# Patient Record
Sex: MALE | Race: OTHER | NOT HISPANIC OR LATINO | URBAN - METROPOLITAN AREA
[De-identification: names, ages, dates, MRNs, and addresses within clinical notes are randomized per-mention and may not be internally consistent; named-entity substitution may affect disease eponyms.]

---

## 2017-07-20 ENCOUNTER — EMERGENCY (EMERGENCY)
Facility: HOSPITAL | Age: 32
LOS: 1 days | Discharge: HOME | End: 2017-07-20
Attending: EMERGENCY MEDICINE | Admitting: EMERGENCY MEDICINE
Payer: OTHER MISCELLANEOUS

## 2017-07-20 VITALS
DIASTOLIC BLOOD PRESSURE: 78 MMHG | OXYGEN SATURATION: 97 % | RESPIRATION RATE: 18 BRPM | SYSTOLIC BLOOD PRESSURE: 139 MMHG | TEMPERATURE: 99 F | HEART RATE: 67 BPM

## 2017-07-20 DIAGNOSIS — R10.31 RIGHT LOWER QUADRANT PAIN: ICD-10-CM

## 2017-07-20 DIAGNOSIS — Z90.49 ACQUIRED ABSENCE OF OTHER SPECIFIED PARTS OF DIGESTIVE TRACT: Chronic | ICD-10-CM

## 2017-07-20 PROCEDURE — 99283 EMERGENCY DEPT VISIT LOW MDM: CPT

## 2017-07-20 NOTE — ED PROVIDER NOTE - MUSCULOSKELETAL NEGATIVE STATEMENT, MLM
no back pain, no gout, no musculoskeletal pain outside of groin pain, no neck pain, and no weakness.

## 2017-07-20 NOTE — ED PROVIDER NOTE - PROGRESS NOTE DETAILS
Discussed case with US who advised a CT scan as an US would not  any complications that have arisen to appendectomy incision site. Will discuss this with pt and offer CT scanning.

## 2017-07-20 NOTE — ED ADULT NURSE NOTE - OBJECTIVE STATEMENT
Pt reports he was lifting a heavy object at work yesterday and since then having right sided lower abdominal discomfort. Pt denies N/V/D, appears in no apparent distress, will continue to monitor

## 2017-07-20 NOTE — ED PROVIDER NOTE - NEUROLOGICAL, MLM
Alert and oriented, no focal deficits, no motor or sensory deficits. Alert and oriented, speech fluent and appropriate cooperative gait narrow and steady based

## 2017-07-20 NOTE — ED PROVIDER NOTE - MEDICAL DECISION MAKING DETAILS
32 yo M with Hx of appendectomy presents c/o right sided groin pain with mild tenderness to RLQ abdomen. Discussed case with US who advised CT scan. After informing pt the advantages and risks of receiving a CT scan versus waiting and following up with their PCP, pt has agreed to wait and follow up with PCP. Also alerted pt that PCP would most likely advise CT scan, but pt still wishes to wait. Will discharge with follow instructions as per request. 30 yo M with Hx of appendectomy presents c/o right sided abdominal pain with mild tenderness to RLQ abdomen. Discussed case with US who advised CT scan. After informing pt the advantages and risks of receiving a CT scan versus waiting and following up with their PCP, pt has agreed to wait and follow up with PCP. Also alerted pt that PCP would most likely advise CT scan if symptoms persist, but pt still wishes to wait. Will discharge with follow instructions as per request. no clinical signs on exam of a surgical abdomen.  tolerates po fluids

## 2017-07-20 NOTE — ED PROVIDER NOTE - GASTROINTESTINAL, MLM
TTP to RLQ of abdomin, inferior to appendectomy scar. No erythema, swelling, rebounding, guarding or crepitus. No masses or tenderness and inguinal area bilaterally and testicles are non tender. TTP to RLQ of abdomen, inferior to appendectomy scar. No erythema, swelling, rebounding, guarding or crepitus. No masses or tenderness and inguinal area bilaterally and testicles are non tender.

## 2017-07-20 NOTE — ED PROVIDER NOTE - OBJECTIVE STATEMENT
30 yo M with Hx of appendectomy 9 years ago presents c/o groin pain. Pt states has had right sided groin pain since yesterday, which first felt when carrying a package upstairs. Pt reports going to work today, but pain persisted so came to ER. Pt notes sometimes has to do some heavy lifting when at work. Denies any medication use, past history of hernia, fever, chills, N/V or back pain. 30 yo M with Hx of appendectomy 9 years ago presents c/o abdominal pain. Pt states has had right sided abdominal pain since yesterday, which first felt when carrying a package upstairs. Pt reports going to work today, but pain persisted so came to ER. Pt notes sometimes has to do some heavy lifting when at work. Denies any medication use, past history of hernia, fever, chills, N/V or back pain. denies groin or testicular pain. no dysuria or hematuria

## 2021-05-13 ENCOUNTER — APPOINTMENT (OUTPATIENT)
Dept: UROLOGY | Facility: CLINIC | Age: 36
End: 2021-05-13

## 2022-02-14 NOTE — ED ADULT NURSE NOTE - NS ED NURSE RECORD ANOTHER HT AND WT
No I personally evaluated the patient. I reviewed the Resident’s or Physician Assistant’s note (as assigned above), and agree with the findings and plan except as documented in my note.

## 2022-03-11 ENCOUNTER — EMERGENCY (EMERGENCY)
Facility: HOSPITAL | Age: 37
LOS: 1 days | Discharge: HOME | End: 2022-03-11
Admitting: EMERGENCY MEDICINE
Payer: COMMERCIAL

## 2022-03-11 VITALS
TEMPERATURE: 98 F | RESPIRATION RATE: 16 BRPM | WEIGHT: 175.93 LBS | OXYGEN SATURATION: 98 % | SYSTOLIC BLOOD PRESSURE: 117 MMHG | HEART RATE: 71 BPM | DIASTOLIC BLOOD PRESSURE: 82 MMHG | HEIGHT: 70 IN

## 2022-03-11 DIAGNOSIS — S70.12XA CONTUSION OF LEFT THIGH, INITIAL ENCOUNTER: ICD-10-CM
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
## 2022-03-11 DIAGNOSIS — Z23 ENCOUNTER FOR IMMUNIZATION: ICD-10-CM

## 2022-03-11 DIAGNOSIS — Z20.3 CONTACT WITH AND (SUSPECTED) EXPOSURE TO RABIES: ICD-10-CM

## 2022-03-11 DIAGNOSIS — M79.652 PAIN IN LEFT THIGH: ICD-10-CM

## 2022-03-11 DIAGNOSIS — Z90.49 ACQUIRED ABSENCE OF OTHER SPECIFIED PARTS OF DIGESTIVE TRACT: Chronic | ICD-10-CM

## 2022-03-11 DIAGNOSIS — Z29.14 ENCOUNTER FOR PROPHYLACTIC RABIES IMMUNE GLOBIN: ICD-10-CM

## 2022-03-11 PROCEDURE — 99284 EMERGENCY DEPT VISIT MOD MDM: CPT

## 2022-03-11 RX ORDER — RABIES VACC, HUMAN DIPLOID/PF 2.5 UNIT
1 VIAL (EA) INTRAMUSCULAR ONCE
Refills: 0 | Status: COMPLETED | OUTPATIENT
Start: 2022-03-11 | End: 2022-03-11

## 2022-03-11 RX ORDER — HUMAN RABIES VIRUS IMMUNE GLOBULIN 150 [IU]/ML
1600 INJECTION, SOLUTION INTRAMUSCULAR ONCE
Refills: 0 | Status: COMPLETED | OUTPATIENT
Start: 2022-03-11 | End: 2022-03-11

## 2022-03-11 RX ADMIN — HUMAN RABIES VIRUS IMMUNE GLOBULIN 1600 UNIT(S): 150 INJECTION, SOLUTION INTRAMUSCULAR at 17:11

## 2022-03-11 RX ADMIN — Medication 1 MILLILITER(S): at 17:21

## 2022-03-11 NOTE — ED PROVIDER NOTE - OBJECTIVE STATEMENT
37yo M presents with c/o left thigh discomfort and bruising after being lightly bit by a armstrong retriever in [place] 4 days ago. reports the wound was not as tender initially but has worsened over the last few days. denies fever, chills, numbness, tingling, weakness. states he is unsure what the dogs vaccine status is and would like rabies prophylaxis.

## 2022-03-11 NOTE — ED PROVIDER NOTE - PATIENT PORTAL LINK FT
You can access the FollowMyHealth Patient Portal offered by [facility] by registering at the following website: [email or web address]. By joining [facility]’s FollowMyHealth portal, you will also be able to view your health information using other applications (apps) compatible with our system.

## 2022-03-11 NOTE — ED PROVIDER NOTE - NSFOLLOWUPINSTRUCTIONS_ED_ALL_ED_FT
RETURN TO ER FOR THE REST OF THE VACCINE SERIES ON     DAY 0 - 3/11  DAY 4 - 3/15  DAY 7 - 3/18  DAY 14 - 3/25    1. What is rabies?    Rabies is a serious disease. It is caused by a virus.  Rabies is mainly a disease of animals. Humans get rabies when they are bitten by infected animals.  At first there might not be any symptoms. But weeks, or even months after a bite, rabies can cause pain, fatigue, headaches, fever, and irritability. These are followed by seizures, hallucinations, and paralysis. Human rabies is almost always fatal.  Wild animals—especially bats—are the most common source of human rabies infection in the [place].   Skunks, raccoons, dogs, cats, coyotes, foxes and other mammals can also transmit the disease.  Human rabies is rare in the [place]. There have been only 55 cases diagnosed since 1990.   However, between 16,000 and 39,000 people are vaccinated each year as a precaution after animal bites. Also, rabies is far more common in other parts of the world, with about 40,000–70,000 rabies-related deaths worldwide each year. Bites from unvaccinated dogs cause most of these cases.  Rabies vaccine can prevent rabies.     2. Rabies vaccine  Rabies vaccine is given to people at high risk of rabies to protect them if they are exposed. It can also prevent the disease if it is given to a person after they have been exposed.  Rabies vaccine is made from killed rabies virus. It cannot cause rabies.    3. Who should get rabies vaccine and when?  Preventive vaccination (no exposure)     People at high risk of exposure to rabies, such as veterinarians, animal handlers, rabies laboratory workers, spelunkers, and rabies biologics production workers should be offered rabies vaccine.The vaccine should also be considered for:  People whose activities bring them into frequent contact with rabies virus or with possibly rabid animals.International travelers who are likely to come in contact with animals in parts of the world where rabies is common.The pre-exposure schedule for rabies vaccination is 3 doses, given at the following times:    Dose 1: As appropriateDose 2: 7 days after Dose 1Dose 3: 21 days or 28 days after Dose 1For laboratory workers and others who may be repeatedly exposed to rabies virus, periodic testing for immunity is recommended, and booster doses should be given as needed. (Testing or booster doses are not recommended for travelers.) Ask your doctor for details.    Vaccination after an exposure    Anyone who has been bitten by an animal, or who otherwise may have been exposed to rabies, should clean the wound and see a doctor immediately. The doctor will determine if they need to be vaccinated.  A person who is exposed and has never been vaccinated against rabies should get 4 doses of rabies vaccine—one dose right away and additional doses on the 3rd, 7th, and 14th days. They should also get another shot called Rabies Immune Globulin at the same time as the first dose.  A person who has been previously vaccinated should get 2 doses of rabies vaccine—one right away and another on the 3rd day. Rabies Immune Globulin is not needed.    4. Tell your doctor if...  Talk with a doctor before getting rabies vaccine if you:  ever had a serious (life-threatening) allergic reaction to a previous dose of rabies vaccine, or to any component of the vaccine; tell your doctor if you have any severe allergies,  have a weakened immune system because of:  HIV/AIDS, or another disease that affects the immune system,treatment with drugs that affect the immune system, such as steroids,cancer, or cancer treatment with radiation or drugs.If you have a minor illness, such as a cold, you can be vaccinated. If you are moderately or severely ill, you should probably wait until you recover before getting a routine (non-exposure) dose of rabies vaccine.  If you have been exposed to rabies virus, you should get the vaccine regardless of any other illnesses you may have.    5. What are the risks from rabies vaccine?  A vaccine, like any medicine, is capable of causing serious problems, such as severe allergic reactions. The risk of a vaccine causing serious harm, or death, is extremely small. Serious problems from rabies vaccine are very rare.  Mild problems : Soreness, redness, swelling, or itching where the shot was given (30%–74%)headache, nausea, abdominal pain, muscle aches, dizziness (5%–40%)    Moderate problems : Hives, pain in the joints, fever (about 6% of booster doses)Other nervous system disorders, such as Guillain–Barré syndrome (GBS), have been reported after rabies vaccine, but this happens so rarely that it is not known whether they are related to the vaccine.    NOTE: Several brands of rabies vaccine are available in the [place], and reactions may vary between brands. Your provider can give you more information about a particular brand.    6. What if there is a serious reaction?  What should I look for?     Look for anything that concerns you, such as signs of a severe allergic reaction, very high fever, or behavior changes.Signs of a severe allergic reaction can include hives, swelling of the face and throat, difficulty breathing, a fast heartbeat, dizziness, and weakness. These would start a few minutes to a few hours after the vaccination.    What should I do?   If you think it is a severe allergic reaction or other emergency that can't wait, call 9-1-1 or get the person to the nearest hospital. Otherwise, call your doctor.Afterward, the reaction should be reported to the Vaccine Adverse Event Reporting System (VAERS). Your doctor might file this report, or you can do it yourself through the VAERS web site at www.vaers.hhs.gov, or by calling 1-925.834.4987.

## 2022-03-11 NOTE — ED ADULT TRIAGE NOTE - CHIEF COMPLAINT QUOTE
pt asking for rabies vaccine bitten by armstrong retriever 4 days ago, did not break skin, on upper left thigh pt states area red and swollen, unknown vaccinations of dog

## 2022-03-11 NOTE — ED PROVIDER NOTE - CLINICAL SUMMARY MEDICAL DECISION MAKING FREE TEXT BOX
pt presents 4 days after superficial dog bite, requesting rabies prophylaxis. wound with some bruising and superficial abrasions noted. discussed pros and cons of prophlyaxis - pt wants prophylaxis. will administer.

## 2022-03-11 NOTE — ED PROVIDER NOTE - PHYSICAL EXAMINATION
Constitutional: Well appearing, awake, alert, oriented to person, place, time/situation and in no apparent distress.  HEENT: Airway patent, NCAT  Resp: no conversational dyspnea, tachypnea, or signs of respiratory distress  Msk: ambulating with normal steady gait  Neuro: A&Ox3 Constitutional: Well appearing, awake, alert, oriented to person, place, time/situation and in no apparent distress.  HEENT: Airway patent, NCAT  Resp: no conversational dyspnea, tachypnea, or signs of respiratory distress  Msk: ambulating with normal steady gait  Neuro: A&Ox3  Skin: left thigh with 4 small superficial abrasions with surrounding ecchymosis without redness, tenderness, warmth, discharge, fluctuance, induration, or pus.

## 2022-03-11 NOTE — ED ADULT NURSE NOTE - OBJECTIVE STATEMENT
Pt presents to ED sp dog bite to the L thigh 4 days ago. Pt showed RN picture of misael, appears scab like without bleeding. Pt endorses feeling tender to touch, slightly erythematous, denies NVD fevers or exudate.

## 2022-03-11 NOTE — ED ADULT NURSE REASSESSMENT NOTE - NS ED NURSE REASSESS COMMENT FT1
Pharmacist GIGI drew up rabies immunoglobulin for myself to administer to pt, pt given info sheets for both immunoglobulin and vaccine

## 2022-03-11 NOTE — ED ADULT NURSE NOTE - NSFALLRSKASSESASSIST_ED_ALL_ED
The patient has not had lab work done for over one year.  I have filled a 90 day supply and the medications, however he needs to go for labs prior to additional refills (labs ordered 4/3/17).  Please call to schedule him with a follow-up visit within the next 90 days, with fasting labs 1 week prior.  If he needs the labs to be done at an external lab facility, please let me know which one, and I will print the orders.   no

## 2022-04-11 ENCOUNTER — TRANSCRIPTION ENCOUNTER (OUTPATIENT)
Age: 37
End: 2022-04-11

## 2022-04-11 ENCOUNTER — OUTPATIENT (OUTPATIENT)
Dept: OUTPATIENT SERVICES | Facility: HOSPITAL | Age: 37
LOS: 1 days | Discharge: HOME | End: 2022-04-11
Payer: COMMERCIAL

## 2022-04-11 ENCOUNTER — RESULT REVIEW (OUTPATIENT)
Age: 37
End: 2022-04-11

## 2022-04-11 VITALS
RESPIRATION RATE: 20 BRPM | SYSTOLIC BLOOD PRESSURE: 128 MMHG | DIASTOLIC BLOOD PRESSURE: 63 MMHG | OXYGEN SATURATION: 98 % | HEART RATE: 59 BPM

## 2022-04-11 VITALS
HEIGHT: 70 IN | TEMPERATURE: 98 F | RESPIRATION RATE: 18 BRPM | HEART RATE: 62 BPM | DIASTOLIC BLOOD PRESSURE: 74 MMHG | SYSTOLIC BLOOD PRESSURE: 152 MMHG | WEIGHT: 177.91 LBS

## 2022-04-11 DIAGNOSIS — Z90.49 ACQUIRED ABSENCE OF OTHER SPECIFIED PARTS OF DIGESTIVE TRACT: Chronic | ICD-10-CM

## 2022-04-11 PROCEDURE — 88305 TISSUE EXAM BY PATHOLOGIST: CPT | Mod: 26

## 2022-04-11 RX ORDER — FAMOTIDINE 10 MG/ML
1 INJECTION INTRAVENOUS
Qty: 0 | Refills: 0 | DISCHARGE

## 2022-04-11 NOTE — ASU PATIENT PROFILE, ADULT - FALL HARM RISK - UNIVERSAL INTERVENTIONS
Bed in lowest position, wheels locked, appropriate side rails in place/Call bell, personal items and telephone in reach/Instruct patient to call for assistance before getting out of bed or chair/Non-slip footwear when patient is out of bed/[place] to call system/Physically safe environment - no spills, clutter or unnecessary equipment/Purposeful Proactive Rounding/Room/bathroom lighting operational, light cord in reach

## 2022-04-11 NOTE — ASU DISCHARGE PLAN (ADULT/PEDIATRIC) - NS MD DC FALL RISK RISK
For information on Fall & Injury Prevention, visit: [email or web address] OR  [email or web address] OR  https://www.cdc.gov/steadi/patient.html

## 2022-04-11 NOTE — CHART NOTE - NSCHARTNOTEFT_GEN_A_CORE
PACU ANESTHESIA ADMISSION NOTE      Procedure: colonoscopy  Post op diagnosis:  Change in bowel habits    ____  Intubated  TV:______       Rate: ______      FiO2: ______    _x___  Patent Airway    _x___  Full return of protective reflexes    _x___  Full recovery from anesthesia / back to baseline status    Vitals:  T-97  HR: 83  BP: 119/63  RR: 18 (04-11-22 @ 11:11) (18 - 18)  SpO2: 98    Mental Status:  _x___ Awake   _____ Alert   _____ Drowsy   _____ Sedated    Nausea/Vomiting:  _x___  NO       ______Yes,   See Post - Op Orders         Pain Scale (0-10):  __0___    Treatment: _x___ None    ____ See Post - Op/PCA Orders    Post - Operative Fluids:   __x__ Oral   ____ See Post - Op Orders    Plan: Discharge:   _x___Home       _____Floor     _____Critical Care    _____  Other:_________________    Comments:  No anesthesia issues or complications noted.  Discharge when criteria met.

## 2022-04-13 LAB — SURGICAL PATHOLOGY STUDY: SIGNIFICANT CHANGE UP

## 2022-04-15 DIAGNOSIS — R19.7 DIARRHEA, UNSPECIFIED: ICD-10-CM

## 2022-04-15 DIAGNOSIS — K64.0 FIRST DEGREE HEMORRHOIDS: ICD-10-CM

## 2024-01-16 PROBLEM — Z00.00 ENCOUNTER FOR PREVENTIVE HEALTH EXAMINATION: Status: ACTIVE | Noted: 2024-01-16

## 2024-01-16 PROBLEM — Z78.9 OTHER SPECIFIED HEALTH STATUS: Chronic | Status: ACTIVE | Noted: 2022-03-11

## 2024-01-23 ENCOUNTER — OUTPATIENT (OUTPATIENT)
Dept: OUTPATIENT SERVICES | Facility: HOSPITAL | Age: 39
LOS: 1 days | End: 2024-01-23

## 2024-01-23 ENCOUNTER — APPOINTMENT (OUTPATIENT)
Dept: MRI IMAGING | Facility: CLINIC | Age: 39
End: 2024-01-23
Payer: COMMERCIAL

## 2024-01-23 DIAGNOSIS — Z90.49 ACQUIRED ABSENCE OF OTHER SPECIFIED PARTS OF DIGESTIVE TRACT: Chronic | ICD-10-CM

## 2024-01-23 PROCEDURE — 72148 MRI LUMBAR SPINE W/O DYE: CPT | Mod: 26

## 2024-04-23 ENCOUNTER — EMERGENCY (EMERGENCY)
Facility: HOSPITAL | Age: 39
LOS: 1 days | Discharge: HOME | End: 2024-04-23
Admitting: EMERGENCY MEDICINE
Payer: COMMERCIAL

## 2024-04-23 VITALS
RESPIRATION RATE: 18 BRPM | DIASTOLIC BLOOD PRESSURE: 78 MMHG | HEART RATE: 87 BPM | TEMPERATURE: 97 F | SYSTOLIC BLOOD PRESSURE: 133 MMHG | OXYGEN SATURATION: 98 %

## 2024-04-23 DIAGNOSIS — Z90.49 ACQUIRED ABSENCE OF OTHER SPECIFIED PARTS OF DIGESTIVE TRACT: Chronic | ICD-10-CM

## 2024-04-23 PROCEDURE — 99284 EMERGENCY DEPT VISIT MOD MDM: CPT
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
## 2024-04-24 RX ORDER — IBUPROFEN 200 MG
600 TABLET ORAL ONCE
Refills: 0 | Status: COMPLETED | OUTPATIENT
Start: 2024-04-24 | End: 2024-04-24

## 2024-04-24 RX ADMIN — Medication 600 MILLIGRAM(S): at 01:40

## 2024-04-24 RX ADMIN — Medication 600 MILLIGRAM(S): at 01:41
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
## 2024-04-24 NOTE — ED PROVIDER NOTE - PATIENT PORTAL LINK FT
You can access the FollowMyHealth Patient Portal offered by [facility] by registering at the following website: [email or web address]. By joining [facility]’s FollowMyHealth portal, you will also be able to view your health information using other applications (apps) compatible with our system.

## 2024-04-24 NOTE — ED ADULT NURSE NOTE - NSFALLUNIVINTERV_ED_ALL_ED
Bed/Stretcher in lowest position, wheels locked, appropriate side rails in place/Call bell, personal items and telephone in reach/Instruct patient to call for assistance before getting out of bed/chair/stretcher/Non-slip footwear applied when patient is off stretcher/[place] to call system/Physically safe environment - no spills, clutter or unnecessary equipment/Purposeful proactive rounding/Room/bathroom lighting operational, light cord in reach

## 2024-04-24 NOTE — ED PROVIDER NOTE - PHYSICAL EXAMINATION
Physical Exam    Vital Signs: I have reviewed the initial vital signs.  Constitutional: well-appearing, appears stated age  Head: +contusion over the nasal pulp, no ttp over the nasal bridge, no ttp over the anterior face  Cspine: No c spine ttp, full neck ROM, flexion and rotation  Cardiovascular: regular rate, regular rhythm, well-perfused extremities, radial pulse +2 and equal b/l  Musculoskeletal: supple neck, no lower extremity edema  Integumentary: warm, dry, no rash  Neurologic: awake, alert, cranial nerves II-XII grossly intact, extremities’ motor and sensory functions grossly intact

## 2024-04-24 NOTE — ED PROVIDER NOTE - NS ED ROS FT
Review of Systems    Constitutional: (-) fever  Eyes/ENT: (-) change in vision, (-) sore throat, (-) ear pain  Musculoskeletal: (-) neck pain, (-) back pain, (-) joint pain  Integumentary: (-) rash, (-) edema  Neurological: (-) headache, (-) altered mental status  Heme/Lymph: (-) easy bruising (-) easy bleeding
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
## 2024-04-26 DIAGNOSIS — S00.83XA CONTUSION OF OTHER PART OF HEAD, INITIAL ENCOUNTER: ICD-10-CM
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
## 2024-08-09 ENCOUNTER — APPOINTMENT (OUTPATIENT)
Dept: UROLOGY | Facility: CLINIC | Age: 39
End: 2024-08-09

## 2024-09-04 ENCOUNTER — LABORATORY RESULT (OUTPATIENT)
Age: 39
End: 2024-09-04

## 2024-09-04 ENCOUNTER — APPOINTMENT (OUTPATIENT)
Dept: UROLOGY | Facility: CLINIC | Age: 39
End: 2024-09-04
Payer: COMMERCIAL

## 2024-09-04 VITALS
DIASTOLIC BLOOD PRESSURE: 75 MMHG | BODY MASS INDEX: 25.48 KG/M2 | SYSTOLIC BLOOD PRESSURE: 120 MMHG | WEIGHT: 178 LBS | OXYGEN SATURATION: 97 % | HEIGHT: 70 IN | HEART RATE: 61 BPM

## 2024-09-04 DIAGNOSIS — R39.89 OTHER SYMPTOMS AND SIGNS INVOLVING THE GENITOURINARY SYSTEM: ICD-10-CM

## 2024-09-04 DIAGNOSIS — R35.0 FREQUENCY OF MICTURITION: ICD-10-CM

## 2024-09-04 DIAGNOSIS — Z78.9 OTHER SPECIFIED HEALTH STATUS: ICD-10-CM

## 2024-09-04 LAB
BILIRUB UR QL STRIP: NORMAL
COLLECTION METHOD: NORMAL
GLUCOSE UR-MCNC: NORMAL
HCG UR QL: 0.2 EU/DL
HGB UR QL STRIP.AUTO: NORMAL
KETONES UR-MCNC: NORMAL
LEUKOCYTE ESTERASE UR QL STRIP: NORMAL
NITRITE UR QL STRIP: NORMAL
PH UR STRIP: 6
PROT UR STRIP-MCNC: NORMAL
SP GR UR STRIP: 1.02

## 2024-09-04 PROCEDURE — 99204 OFFICE O/P NEW MOD 45 MIN: CPT | Mod: 25

## 2024-09-04 PROCEDURE — 81003 URINALYSIS AUTO W/O SCOPE: CPT | Mod: QW

## 2024-09-04 NOTE — HISTORY OF PRESENT ILLNESS
[FreeTextEntry1] : 39 yo male is a new patient here for urinary frequency.   Urinates sitting down. When he feels he is done, he stands up but feels there is a few more drops he can urinate out. The few drops remaining is a bothersome sensation for him. He waits 1 hour before the next void and urinates a normal volume. Started 6-7 months ago.  States he saw a previous Urologist to discuss the same concern and was told he had a UTI; was treated with an antibiotic. States he was supposed to obtain an ultrasound and follow up with his Urologist but could not get a quick appointment. States that when he took the antibiotic the urine leakage drops improved. During wake hours urinates every 1-2 hours or more depending on how much fluid intake he has.  Nocturia 2x nightly.  Endorses urinary stream is "normal strength" Denies urinary straining, urinary stream intermittency.  Feels he empties his bladder except for the last few drops; states the drops is about 3 drops. Drinks regular coffee, 1 cup daily. Denies tea, soda.  Takes pre-workout before gym. Thinks that when he does not have pre-workout he does not have the bothersome sensation of last few urine drops remaining.  Finds the urine drops are bothersome especially when he does his Christianity prayers as it involves movement and he sometimes finds there is urine leakage during this and his prayers are disrupted.  Denies dysuria, gross hematuria  Prevoid volume 84 mL Post void residual 0 mL

## 2024-09-04 NOTE — REVIEW OF SYSTEMS
[see HPI] : see HPI [Fever] : no fever [Chills] : no chills [Abdominal Pain] : no abdominal pain [Vomiting] : no vomiting

## 2024-09-04 NOTE — PHYSICAL EXAM
[Normal Appearance] : normal appearance [Well Groomed] : well groomed [General Appearance - In No Acute Distress] : no acute distress [] : no respiratory distress [Respiration, Rhythm And Depth] : normal respiratory rhythm and effort [Exaggerated Use Of Accessory Muscles For Inspiration] : no accessory muscle use [Abdomen Tenderness] : non-tender [Abdomen Soft] : soft [Normal Station and Gait] : the gait and station were normal for the patient's age [No Focal Deficits] : no focal deficits [Oriented To Time, Place, And Person] : oriented to person, place, and time [Affect] : the affect was normal [Mood] : the mood was normal

## 2024-09-04 NOTE — ASSESSMENT
[FreeTextEntry1] : 38-year-old male with urinary frequency.  Suggested he massage the extra few drops of urine from his penis after urinating. Also suggested timed voiding and urinating before his prayers to avoid the few drops of urine leakage. Since he noticed improved symptoms while off preworkout, suggested he try stopping his preworkout for a period to see if there is a difference.  Ordered U/A and UCx. Will inform patient is results are significant.  Follow up as needed.

## 2024-09-05 LAB
APPEARANCE: CLEAR
BILIRUBIN URINE: NEGATIVE
BLOOD URINE: NEGATIVE
COLOR: YELLOW
GLUCOSE QUALITATIVE U: NEGATIVE MG/DL
KETONES URINE: NEGATIVE MG/DL
LEUKOCYTE ESTERASE URINE: ABNORMAL
NITRITE URINE: NEGATIVE
PH URINE: 6
PROTEIN URINE: NEGATIVE MG/DL
SPECIFIC GRAVITY URINE: 1.02
UROBILINOGEN URINE: 0.2 MG/DL

## 2024-09-06 LAB — BACTERIA UR CULT: NORMAL

## 2025-01-17 NOTE — ED PROVIDER NOTE - DISCHARGE DATE
If you are a smoker, it is important for your health to stop smoking. Please be aware that second hand smoke is also harmful.
11-Mar-2022